# Patient Record
Sex: MALE
[De-identification: names, ages, dates, MRNs, and addresses within clinical notes are randomized per-mention and may not be internally consistent; named-entity substitution may affect disease eponyms.]

---

## 2018-06-04 ENCOUNTER — HOSPITAL ENCOUNTER (EMERGENCY)
Dept: HOSPITAL 14 - H.ER | Age: 28
Discharge: HOME | End: 2018-06-04
Payer: MEDICAID

## 2018-06-04 VITALS — SYSTOLIC BLOOD PRESSURE: 148 MMHG | HEART RATE: 90 BPM | RESPIRATION RATE: 18 BRPM | DIASTOLIC BLOOD PRESSURE: 90 MMHG

## 2018-06-04 VITALS — OXYGEN SATURATION: 97 % | TEMPERATURE: 98.1 F

## 2018-06-04 VITALS — BODY MASS INDEX: 25 KG/M2

## 2018-06-04 DIAGNOSIS — S43.51XA: Primary | ICD-10-CM
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
## 2018-06-04 DIAGNOSIS — S83.421A: ICD-10-CM

## 2018-06-04 LAB
ALBUMIN SERPL-MCNC: 4.3 G/DL (ref 3.5–5)
ALBUMIN/GLOB SERPL: 1.1 {RATIO} (ref 1–2.1)
ALT SERPL-CCNC: 31 U/L (ref 21–72)
AST SERPL-CCNC: 33 U/L (ref 17–59)
BASOPHILS # BLD AUTO: 0 K/UL (ref 0–0.2)
BASOPHILS NFR BLD: 0.4 % (ref 0–2)
BUN SERPL-MCNC: 11 MG/DL (ref 9–20)
CALCIUM SERPL-MCNC: 9.6 MG/DL (ref 8.4–10.2)
EOSINOPHIL # BLD AUTO: 0.2 K/UL (ref 0–0.7)
EOSINOPHIL NFR BLD: 1.4 % (ref 0–4)
ERYTHROCYTE [DISTWIDTH] IN BLOOD BY AUTOMATED COUNT: 13.5 % (ref 11.5–14.5)
GFR NON-AFRICAN AMERICAN: > 60
HGB BLD-MCNC: 14.7 G/DL (ref 12–18)
INR PPP: 1.1 (ref 0.9–1.2)
LYMPHOCYTES # BLD AUTO: 1.2 K/UL (ref 1–4.3)
LYMPHOCYTES NFR BLD AUTO: 10.5 % (ref 20–40)
MCH RBC QN AUTO: 31.8 PG (ref 27–31)
MCHC RBC AUTO-ENTMCNC: 35.6 G/DL (ref 33–37)
MCV RBC AUTO: 89.5 FL (ref 80–94)
MONOCYTES # BLD: 0.9 K/UL (ref 0–0.8)
MONOCYTES NFR BLD: 8.3 % (ref 0–10)
NEUTROPHILS # BLD: 9 K/UL (ref 1.8–7)
NEUTROPHILS NFR BLD AUTO: 79.4 % (ref 50–75)
NRBC BLD AUTO-RTO: 0 % (ref 0–0)
PLATELET # BLD: 416 K/UL (ref 130–400)
PMV BLD AUTO: 8.8 FL (ref 7.2–11.7)
PROTHROMBIN TIME: 11.9 SECONDS (ref 9.8–13.1)
RBC # BLD AUTO: 4.6 MIL/UL (ref 4.4–5.9)
WBC # BLD AUTO: 11.4 K/UL (ref 4.8–10.8)

## 2018-06-04 PROCEDURE — 73721 MRI JNT OF LWR EXTRE W/O DYE: CPT

## 2018-06-04 PROCEDURE — 73630 X-RAY EXAM OF FOOT: CPT

## 2018-06-04 PROCEDURE — 80053 COMPREHEN METABOLIC PANEL: CPT

## 2018-06-04 PROCEDURE — 96374 THER/PROPH/DIAG INJ IV PUSH: CPT

## 2018-06-04 PROCEDURE — 73562 X-RAY EXAM OF KNEE 3: CPT

## 2018-06-04 PROCEDURE — 76881 US COMPL JOINT R-T W/IMG: CPT

## 2018-06-04 PROCEDURE — 99284 EMERGENCY DEPT VISIT MOD MDM: CPT

## 2018-06-04 PROCEDURE — 85610 PROTHROMBIN TIME: CPT

## 2018-06-04 PROCEDURE — 73590 X-RAY EXAM OF LOWER LEG: CPT

## 2018-06-04 PROCEDURE — 85025 COMPLETE CBC W/AUTO DIFF WBC: CPT

## 2018-06-04 PROCEDURE — 73610 X-RAY EXAM OF ANKLE: CPT
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
## 2018-06-04 NOTE — RAD
PROCEDURE:  Right Foot Radiographs.



HISTORY:

trauma  



COMPARISON:

None.



FINDINGS:



BONES:

No acute fracture or destructive bony lesion identified. 



JOINTS:

A mild-to-moderate hallux valgus deformity is identified. 



SOFT TISSUES:

Diffuse soft tissue edema is appreciated throughout the right foot 

soft tissues as well as incidentally at the right ankle. 



OTHER FINDINGS:

None.



IMPRESSION:

Hallux valgus deformity. No acute fracture, subluxation or 

dislocation. Moderate soft tissue edema is surrounds the ankle ink 

and extends into the foot soft tissues relatively diffusely. No 

retained radiodense foreign body or emphysema soft tissue changes 

accompany these findings.

## 2018-06-04 NOTE — MRI
PROCEDURE:  MRI Right Knee



HISTORY:

Pain. Trauma 



COMPARISON:

Comparison is made to the previous x-ray done on 06/04/2018 



TECHNIQUE:

Multiecho multiplanar sequences were performed through the right knee.



FINDINGS:



ANTERIOR CRUCIATE LIGAMENT::

Mild-to-moderate grade 2 sprain of the anterior cruciate ligament 

noted. 



POSTERIOR CRUCIATE LIGAMENT::

Intact. 



MEDIAL MENISCUS::

Intact. 



LATERAL MENISCUS::

Intact. 



MEDIAL COLLATERAL LIGAMENT::

Mild grade 1 sprain of the medial collateral ligament noted. 



LATERAL COLLATERAL LIGAMENT COMPLEX::

Mild-to-moderate grade 2 sprain of the lateral collateral ligament 

noted. 



QUADRICEPS TENDON::

Mild increased signal at the distal quadriceps tendon suggestive of 

mild tendinopathy. 



PATELLAR TENDON::

Intact. 



CARTILAGE::

Intact. 



JOINT FLUID::

Trace joint effusion noted. 



OSSEOUS STRUCTURES::

Intact. 



OTHER FINDINGS:

Mild-to-moderate soft tissue swelling and subcutaneous edema noted. 



IMPRESSION:

Mild-to-moderate grade 2 sprain of the anterior cruciate ligament.



Mild-to-moderate grade 2 sprain of the lateral collateral ligament.



No evidence of significant meniscus tear.



Trace right knee joint effusion.
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
## 2018-06-04 NOTE — RAD
PROCEDURE:  Radiographs of the right tibia and fibula. 



HISTORY:

trauma



COMPARISON:

None available.



TECHNIQUE:

Frontal and lateral views obtained. 



FINDINGS:



BONES:

A nonspecific tiny radiodensity seen anterior to the distal most 

margins of the anterior tibia only in the lateral view. This is not 

corroborated by lateral views of the right foot and right ankle and 

further clinical correlation is advised. This not felt to represent a 

definite chip or avulsion fracture. No prominent fracture or 

destructive bone lesion. A small accessory ossicle seen inferior to 

the lateral malleolus.



JOINT SPACES:

Unremarkable.



OTHER FINDINGS:

Soft tissue edema at the incidentally noted.



IMPRESSION:

No prominent fracture or destructive bony lesion appreciable 

throughout the right tibia or fibula. A tiny chip or avulsion 

fracture is not favored at the anterior margins of the distal most 

tibia as is not corroborated and lateral views of the right ankle or 

foot.  Is also seen on only one view, lateral view of the distal 

tibia.  Clinically correlate further nevertheless. Incidental ankle 

soft tissue edema identified.

## 2018-06-15 ENCOUNTER — HOSPITAL ENCOUNTER (EMERGENCY)
Dept: HOSPITAL 14 - H.ER | Age: 28
Discharge: HOME | End: 2018-06-15
Payer: MEDICAID

## 2018-06-15 VITALS
TEMPERATURE: 97.9 F | DIASTOLIC BLOOD PRESSURE: 69 MMHG | HEART RATE: 73 BPM | SYSTOLIC BLOOD PRESSURE: 117 MMHG | OXYGEN SATURATION: 99 % | RESPIRATION RATE: 16 BRPM

## 2018-06-15 VITALS — BODY MASS INDEX: 25.8 KG/M2

## 2018-06-15 DIAGNOSIS — M79.661: Primary | ICD-10-CM

## 2018-06-15 NOTE — RAD
PROCEDURE:  Right Foot Radiographs.



HISTORY:

pain  



COMPARISON:

None.



FINDINGS:



BONES:

No acute fracture or destructive bony lesion identified.



JOINTS:

No subluxation or dislocation however there is a mild hallux valgus 

deformity evident.  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 



SOFT TISSUES:

Mild soft tissue edema surrounds the foot but is predominantly dorsal 

in location at the midfoot. 



OTHER FINDINGS:

None.



IMPRESSION:

Mild diffuse soft tissue edema is seen associated with the foot but 

the majority is midfoot and dorsal. No retained radiodense foreign 

body or emphysema soft tissue changes.



Mild hallux valgus deformity.  

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
## 2018-06-15 NOTE — RAD
PROCEDURE:  Radiographs of the right tibia and fibula. 



HISTORY:

pain, requested by podiatry



COMPARISON:

None available.



TECHNIQUE:

Frontal and lateral views obtained. 



FINDINGS:



BONES:

No fracture or destructive lesion.



JOINT SPACES:

Unremarkable.



OTHER FINDINGS:

Soft tissue edema overlies the medial malleolus.



IMPRESSION:

No acute fracture or destructive bony lesion identified at the right 

tibia or fibula.  Medial malleolar soft tissue edema identified.

## 2018-06-15 NOTE — US
PROCEDURE:  Right lower extremity venous duplex Doppler. 



HISTORY:

pain







COMPARISON:

None available.



TECHNIQUE:

Common femoral, superficial femoral, popliteal and posterior tibial 

veins were evaluated. Flow was assessed with color Doppler, 

compressibility, assessment of phasic flow and augmentation response. 



FINDINGS:



COMMON FEMORAL VEIN:

Unremarkable.



SUPERFICIAL FEMORAL VEIN:

Unremarkable.



POPLITEAL VEIN:

Unremarkable.



POSTERIOR TIBIAL VEIN:

Unremarkable.



OTHER FINDINGS:

Elongated fluid collection mid to distal shaft.  The possibility of 

ruptured popliteal cyst should be considered with fluid dissecting 

between fascia layers.  There is no surrounding vascularity on color 

Doppler interrogation to suggest an abscess.



IMPRESSION:

No evidence of deep venous thrombosis.  Elongated fluid collection 

possibly reflecting ruptured popliteal cyst.  No evidence of abscess.

## 2019-08-06 ENCOUNTER — EMERGENCY (EMERGENCY)
Facility: HOSPITAL | Age: 29
LOS: 1 days | Discharge: HOME | End: 2019-08-06
Admitting: EMERGENCY MEDICINE
Payer: OTHER MISCELLANEOUS

## 2019-08-06 VITALS
RESPIRATION RATE: 17 BRPM | DIASTOLIC BLOOD PRESSURE: 76 MMHG | HEART RATE: 63 BPM | WEIGHT: 149.91 LBS | SYSTOLIC BLOOD PRESSURE: 126 MMHG | OXYGEN SATURATION: 97 % | TEMPERATURE: 98 F

## 2019-08-06 DIAGNOSIS — Z23 ENCOUNTER FOR IMMUNIZATION: ICD-10-CM
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
## 2019-08-06 DIAGNOSIS — S09.90XA UNSPECIFIED INJURY OF HEAD, INITIAL ENCOUNTER: ICD-10-CM

## 2019-08-06 DIAGNOSIS — S06.9X9A UNSPECIFIED INTRACRANIAL INJURY WITH LOSS OF CONSCIOUSNESS OF UNSPECIFIED DURATION, INITIAL ENCOUNTER: ICD-10-CM
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
## 2019-08-06 DIAGNOSIS — S01.01XA LACERATION WITHOUT FOREIGN BODY OF SCALP, INITIAL ENCOUNTER: ICD-10-CM
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
## 2019-08-06 PROCEDURE — 12001 RPR S/N/AX/GEN/TRNK 2.5CM/<: CPT

## 2019-08-06 PROCEDURE — 99284 EMERGENCY DEPT VISIT MOD MDM: CPT | Mod: 25

## 2019-08-06 PROCEDURE — 72125 CT NECK SPINE W/O DYE: CPT | Mod: 26

## 2019-08-06 PROCEDURE — 70450 CT HEAD/BRAIN W/O DYE: CPT | Mod: 26

## 2019-08-06 RX ORDER — ACETAMINOPHEN 500 MG
650 TABLET ORAL ONCE
Refills: 0 | Status: COMPLETED | OUTPATIENT
Start: 2019-08-06 | End: 2019-08-06

## 2019-08-06 RX ORDER — TETANUS TOXOID, REDUCED DIPHTHERIA TOXOID AND ACELLULAR PERTUSSIS VACCINE, ADSORBED 5; 2.5; 8; 8; 2.5 [IU]/.5ML; [IU]/.5ML; UG/.5ML; UG/.5ML; UG/.5ML
0.5 SUSPENSION INTRAMUSCULAR ONCE
Refills: 0 | Status: COMPLETED | OUTPATIENT
Start: 2019-08-06 | End: 2019-08-06

## 2019-08-06 NOTE — ED ADULT NURSE NOTE - OBJECTIVE STATEMENT
Pt is a 28y male complaining of head injury s/p getting hit in head with large piece of wood when moving. As per pt he was hit in head while in moving truck jumped out of truck and saw blood. Pt states that he does not remember what happened directly following event. As per manager who was there at the time pt had near syncopal episode. denies use of blood thinners. Pt arrived with C collar in place. Laceration noted to top of head bleeding under control at this time. Pt complaining of headache and dizziness at this time.

## 2019-08-06 NOTE — ED PROVIDER NOTE - NS ED ROS FT
Other than symptoms associated with present events the following is reported:  General:  No fever, no chills, no weight loss.  HEENT:  No sore throat.  Respiratory: No cough, no dyspnea, no wheeze.  Cardiovascular:  No chest pain, no palpitations, no orthopnea.  GI: No abdominal pain, no nausea/vomiting, no diarrhea.  : No dysuria, no frequency, no urgency.  Musculoskeletal:  No joint pain, no myalgia.  Endocrine:  No generalized weakness, no polyuria.  Neurological:  +headache, no focal weakness.   Psychiatric: No emotional stress, no depression.  Derm:  No rash.  Heme:  No bruising, no bleeding.

## 2019-08-06 NOTE — ED PROVIDER NOTE - CARE PROVIDER_API CALL
Yessica Villa)  Neurology; Vascular Neurology  [address], [address] [place] [address]  Phone: [phone]  Fax: [phone]  Follow Up Time:     Zaid Staples)  Plastic Surgery; Surgery  [address] [place] [id number]  Phone: [phone]  Fax: [phone]  Follow Up Time:

## 2019-08-06 NOTE — ED PROVIDER NOTE - OBJECTIVE STATEMENT
27 y/o M    no pmhx    Pt presents to ER with c/o blunt trauma to top of head while at work. States a very heavy wooden beam feel from a height of ~3 feet and hit him in the center of top of his head. Pt slowly "fell" to ground into seated position (did not hit head or injury himself in fall). Pt then saw that he was bleeding and felt "queasy". Co-workers states he became very pale shortly after and had a very brief LOC- pt was lowered to ground and immediately regained consciousness. Now presenting to ED with laceration to central scalp and pain to area. Denies weakness, numbness, gait/balance changes, visual changes. Denies blood thinner use

## 2019-08-06 NOTE — ED PROVIDER NOTE - NSFOLLOWUPINSTRUCTIONS_ED_ALL_ED_FT
-PLEASE FOLLOW-UP WITH YOUR PRIMARY CARE DOCTOR IN 1-2 DAYS.  BRING ALL PAPERWORK FROM TODAY'S VISIT TO YOUR FOLLOW-UP VISIT.  IF YOU DO NOT HAVE A PRIMARY CARE DOCTOR PLEASE REFER TO THE OFFICE/CLINIC INFORMATION GIVEN ABOVE.  YOU MAY ALSO CALL [phone] AND ASK FOR MSNatalie ROSALBA FARIAS.  SHE CAN HELP YOU MAKE A FOLLOW-UP APPOINTMENT.  HER HOURS ARE 11AM-7PM MONDAY - FRIDAY.  -TAKE OVER THE COUNTER TYLENOL 650MG BY MOUTH EVERY 4-6 HOURS AS NEEDED FOR PAIN.  DO NOT MIX WITH ALCOHOL OR OTHER PRESCRIPTION MEDICATIONS THAT ALREADY CONTAIN TYLENOL OR ACETAMINOPHEN.   -TAKE OVER THE COUNTER IBUPROFEN 400-600MG BY MOUTH EVERY 8 HOURS AS NEEDED FOR PAIN.  BE SURE TO TAKE WITH FOOD OR MILK AS THIS MEDICATION CAN CAUSE STOMACH IRRITATION.  -PLEASE RETURN TO THE ER IMMEDIATELY OR CALL 911 FOR ANY HIGH FEVER, TROUBLE BREATHING, VOMITING, SEVERE PAIN, OR ANY OTHER CONCERNS.     Closed Head Injury    A closed head injury is an injury to your head that may or may not involve a traumatic brain injury (TBI). Symptoms of TBI can be short or long lasting and include headache, dizziness, interference with memory or speech, fatigue, confusion, changes in sleep, mood changes, nausea, depression/anxiety, and dulling of senses. Make sure to obtain proper rest which includes getting plenty of sleep, avoiding excessive visual stimulation, and avoiding activities that may cause physical or mental stress. Avoid any situation where there is potential for another head injury, including sports.    SEEK IMMEDIATE MEDICAL CARE IF YOU HAVE ANY OF THE FOLLOWING SYMPTOMS: unusual drowsiness, vomiting, severe dizziness, seizures, lightheadedness, muscular weakness, different pupil sizes, visual changes, or clear or bloody discharge from your ears or nose.     Laceration    A laceration is a cut that goes through all of the layers of the skin and into the tissue that is right under the skin. Some lacerations heal on their own. Others need to be closed with skin adhesive strips, skin glue, stitches (sutures), or staples. Proper laceration care minimizes the risk of infection and helps the laceration to heal better.  If non-absorbable stitches or staples have been placed, they must be taken out within the time frame instructed by your healthcare provider.    SEEK IMMEDIATE MEDICAL CARE IF YOU HAVE ANY OF THE FOLLOWING SYMPTOMS: swelling around the wound, worsening pain, drainage from the wound, red streaking going away from your wound, inability to move finger or toe near the laceration, or discoloration of skin near the laceration.

## 2019-08-06 NOTE — ED PROVIDER NOTE - CLINICAL SUMMARY MEDICAL DECISION MAKING FREE TEXT BOX
head trauma - heavy object from height. +LOC after pt had sat down s/p trauma. +scalp laceration. neuro/motor/sensory intact. in c-collar. no neck pain. Will get stat head CT and c-spine CT. Will close lac with staples- see procedure note. will give tylenol for pain relief.

## 2019-08-06 NOTE — ED PROVIDER NOTE - PHYSICAL EXAMINATION
VITAL SIGNS: I have reviewed nursing notes and confirm.  CONSTITUTIONAL: Well-developed; well-nourished; in no acute distress.  SKIN: Skin is warm and dry, no acute rash.  HEAD: +hematoma to top of scalp with 3cm superficial laceration. oozing blood. clean. no foreign body. no bony point tenderness or instability.   EYES: PERRL, EOM intact; conjunctiva and sclera clear.  ENT: No nasal discharge; airway clear.  NECK: Supple; non tender. no midline tenderness or step-off  CARD: S1, S2 normal; no murmurs, gallops, or rubs. Regular rate and rhythm.  RESP: No wheezes, rales or rhonchi.  ABD: Normal bowel sounds; soft; non-distended; non-tender; no hepatosplenomegaly.  EXT: Normal ROM. No clubbing, cyanosis or edema.  NEURO: Alert, oriented. Grossly unremarkable. CNs intact. Normal Romberg. Normal finger to nose. No pronator drift. Normal rapi alternating movements/heal to shin. Sensation intact to all extremities. 5/5 strength to all extremities.   PSYCH: Cooperative, appropriate.

## 2019-08-06 NOTE — ED ADULT TRIAGE NOTE - CHIEF COMPLAINT QUOTE
BIBA accompanied by , s/p head injury after a wooden beam fell on his head. Pt with laceration to top of his head, bleeding controlled. Denies LOC. Denies use of blood thinner. Arrived with c-collar in placed.

## 2019-08-06 NOTE — ED PROVIDER NOTE - CARE PLAN
Principal Discharge DX:	Head trauma, initial encounter  Secondary Diagnosis:	Laceration of scalp, initial encounter

## 2019-08-07 ENCOUNTER — INBOUND DOCUMENT (OUTPATIENT)
Age: 29
End: 2019-08-07

## 2019-08-07 VITALS
HEART RATE: 65 BPM | DIASTOLIC BLOOD PRESSURE: 68 MMHG | RESPIRATION RATE: 20 BRPM | TEMPERATURE: 98 F | OXYGEN SATURATION: 99 % | SYSTOLIC BLOOD PRESSURE: 104 MMHG

## 2019-08-07 PROBLEM — Z00.00 ENCOUNTER FOR PREVENTIVE HEALTH EXAMINATION: Status: ACTIVE | Noted: 2019-08-07

## 2019-08-07 RX ORDER — SUMATRIPTAN SUCCINATE 4 MG/.5ML
6 INJECTION, SOLUTION SUBCUTANEOUS ONCE
Refills: 0 | Status: DISCONTINUED | OUTPATIENT
Start: 2019-08-07 | End: 2019-08-07

## 2019-08-07 RX ORDER — ACETAMINOPHEN 500 MG
2 TABLET ORAL
Qty: 20 | Refills: 0
Start: 2019-08-07

## 2019-08-07 RX ORDER — IBUPROFEN 200 MG
1 TABLET ORAL
Qty: 20 | Refills: 0
Start: 2019-08-07

## 2019-08-07 RX ADMIN — TETANUS TOXOID, REDUCED DIPHTHERIA TOXOID AND ACELLULAR PERTUSSIS VACCINE, ADSORBED 0.5 MILLILITER(S): 5; 2.5; 8; 8; 2.5 SUSPENSION INTRAMUSCULAR at 00:41

## 2021-10-18 NOTE — RAD
Cardiology Internal Medicine PROCEDURE:  Right Ankle Radiographs.



HISTORY:

pain, requested b podiatry  



COMPARISON:

Right ankle radiographs 06/04/2018.



FINDINGS:



BONES:

No acute fracture or destructive bony lesion identified. A small 

accessory ossicle seen inferior to the right lateral malleolus once 

again. 



JOINTS:

No fracture subluxation. No definite ostial arthritis pattern.  

Medial malleolar soft tissue edema is reiterated. . Ankle mortise 

maintained. Talar dome intact



SOFT TISSUES:

See joint section above. 



OTHER FINDINGS:

None.



IMPRESSION:

Stable radiography with no acute fracture or dislocation appreciated 

in the interval.  Medial malleolar soft tissue edema is reiterated. Cardiology Cardiology Internal Medicine Internal Medicine Neurology Neurology Neurology